# Patient Record
Sex: FEMALE | Race: WHITE | NOT HISPANIC OR LATINO | URBAN - NONMETROPOLITAN AREA
[De-identification: names, ages, dates, MRNs, and addresses within clinical notes are randomized per-mention and may not be internally consistent; named-entity substitution may affect disease eponyms.]

---

## 2019-04-18 ENCOUNTER — IMPORTED ENCOUNTER (OUTPATIENT)
Dept: URBAN - NONMETROPOLITAN AREA CLINIC 1 | Facility: CLINIC | Age: 24
End: 2019-04-18

## 2019-04-18 PROCEDURE — 92310 CONTACT LENS FITTING OU: CPT

## 2019-04-18 PROCEDURE — 92004 COMPRE OPH EXAM NEW PT 1/>: CPT

## 2019-04-18 PROCEDURE — 92015 DETERMINE REFRACTIVE STATE: CPT

## 2019-04-18 NOTE — PATIENT DISCUSSION
Simple Myopia OU-  discussed findings w/patient-  new spectacle/CL Rx issued-  continue to monitor yearly or prn; 's Notes: MR 4/18/2019DFE 4/18/2019

## 2019-04-19 PROBLEM — H52.13: Noted: 2019-04-19

## 2020-07-15 ENCOUNTER — IMPORTED ENCOUNTER (OUTPATIENT)
Dept: URBAN - NONMETROPOLITAN AREA CLINIC 1 | Facility: CLINIC | Age: 25
End: 2020-07-15

## 2020-07-15 PROCEDURE — 92015 DETERMINE REFRACTIVE STATE: CPT

## 2020-07-15 PROCEDURE — V2520 CONTACT LENS HYDROPHILIC: HCPCS

## 2020-07-15 PROCEDURE — 92014 COMPRE OPH EXAM EST PT 1/>: CPT

## 2020-07-15 PROCEDURE — 92310 CONTACT LENS FITTING OU: CPT

## 2020-07-15 NOTE — PATIENT DISCUSSION
Simple Myopia OU-  discussed findings w/patient-  new spectacle/CL Rx issued-  continue to monitor yearly or prn; 's Notes: MR 4/18/2019<br />DFE 4/18/2019<br />

## 2021-08-06 ENCOUNTER — IMPORTED ENCOUNTER (OUTPATIENT)
Dept: URBAN - NONMETROPOLITAN AREA CLINIC 1 | Facility: CLINIC | Age: 26
End: 2021-08-06

## 2021-08-06 PROCEDURE — 92310 CONTACT LENS FITTING OU: CPT

## 2021-08-06 PROCEDURE — 92014 COMPRE OPH EXAM EST PT 1/>: CPT

## 2021-08-06 PROCEDURE — 92015 DETERMINE REFRACTIVE STATE: CPT

## 2021-08-06 NOTE — PATIENT DISCUSSION
Simple Myopia OU-  discussed findings w/patient-  no changes noted at this time-  new spectacle/CL Rx issued-  continue to monitor yearly or prn; 's Notes: MR 8/6/2021DFE 8/6/2021

## 2022-04-15 ASSESSMENT — VISUAL ACUITY
OS_SC: 20/20
OD_SC: 20/20
OS_SC: 20/20
OD_SC: 20/20-1
OD_SC: 20/20
OU_SC: 20/20
OS_SC: 20/20-1

## 2022-04-15 ASSESSMENT — TONOMETRY
OD_IOP_MMHG: 13
OD_IOP_MMHG: 14
OS_IOP_MMHG: 13
OD_IOP_MMHG: 13
OS_IOP_MMHG: 13
OS_IOP_MMHG: 14

## 2024-03-22 ENCOUNTER — COMPREHENSIVE EXAM (OUTPATIENT)
Dept: URBAN - NONMETROPOLITAN AREA CLINIC 4 | Facility: CLINIC | Age: 29
End: 2024-03-22

## 2024-03-22 DIAGNOSIS — H52.13: ICD-10-CM

## 2024-03-22 PROCEDURE — 92310-E CONTACT LENS FITTING ESTABLISH PATIENT

## 2024-03-22 PROCEDURE — 92014 COMPRE OPH EXAM EST PT 1/>: CPT

## 2024-03-22 PROCEDURE — 92015 DETERMINE REFRACTIVE STATE: CPT

## 2024-03-22 ASSESSMENT — VISUAL ACUITY
OS_CC: 20/20
OD_CC: 20/20

## 2024-03-22 ASSESSMENT — TONOMETRY
OD_IOP_MMHG: 14
OS_IOP_MMHG: 14

## 2025-03-24 ENCOUNTER — COMPREHENSIVE EXAM (OUTPATIENT)
Age: 30
End: 2025-03-24

## 2025-03-24 DIAGNOSIS — H52.13: ICD-10-CM

## 2025-03-24 PROCEDURE — 92015 DETERMINE REFRACTIVE STATE: CPT

## 2025-03-24 PROCEDURE — 92014 COMPRE OPH EXAM EST PT 1/>: CPT

## 2025-03-24 PROCEDURE — 92310-1 LEVEL 1 SOFT LENS UPDATE
